# Patient Record
Sex: FEMALE | Race: WHITE | NOT HISPANIC OR LATINO
[De-identification: names, ages, dates, MRNs, and addresses within clinical notes are randomized per-mention and may not be internally consistent; named-entity substitution may affect disease eponyms.]

---

## 2020-11-12 ENCOUNTER — APPOINTMENT (OUTPATIENT)
Dept: INTERNAL MEDICINE | Facility: CLINIC | Age: 40
End: 2020-11-12
Payer: COMMERCIAL

## 2020-11-12 ENCOUNTER — TRANSCRIPTION ENCOUNTER (OUTPATIENT)
Age: 40
End: 2020-11-12

## 2020-11-12 VITALS — HEIGHT: 62.5 IN | WEIGHT: 124 LBS | BODY MASS INDEX: 22.25 KG/M2 | TEMPERATURE: 98.6 F

## 2020-11-12 VITALS — DIASTOLIC BLOOD PRESSURE: 60 MMHG | SYSTOLIC BLOOD PRESSURE: 105 MMHG

## 2020-11-12 DIAGNOSIS — Z82.49 FAMILY HISTORY OF ISCHEMIC HEART DISEASE AND OTHER DISEASES OF THE CIRCULATORY SYSTEM: ICD-10-CM

## 2020-11-12 DIAGNOSIS — Z23 ENCOUNTER FOR IMMUNIZATION: ICD-10-CM

## 2020-11-12 DIAGNOSIS — Z78.9 OTHER SPECIFIED HEALTH STATUS: ICD-10-CM

## 2020-11-12 PROCEDURE — G0008: CPT

## 2020-11-12 PROCEDURE — G0444 DEPRESSION SCREEN ANNUAL: CPT | Mod: NC,59

## 2020-11-12 PROCEDURE — 90686 IIV4 VACC NO PRSV 0.5 ML IM: CPT

## 2020-11-12 PROCEDURE — 99072 ADDL SUPL MATRL&STAF TM PHE: CPT

## 2020-11-12 PROCEDURE — 99385 PREV VISIT NEW AGE 18-39: CPT | Mod: 25

## 2020-11-12 PROCEDURE — 36415 COLL VENOUS BLD VENIPUNCTURE: CPT

## 2020-11-12 NOTE — HEALTH RISK ASSESSMENT
[Yes] : Yes [2 - 3 times a week (3 pts)] : 2 - 3  times a week (3 points) [1 or 2 (0 pts)] : 1 or 2 (0 points) [Never (0 pts)] : Never (0 points) [No] : In the past 12 months have you used drugs other than those required for medical reasons? No [No falls in past year] : Patient reported no falls in the past year [0] : 2) Feeling down, depressed, or hopeless: Not at all (0) [HIV test declined] : HIV test declined [Hepatitis C test declined] : Hepatitis C test declined [With Family] : lives with family [# of Members in Household ___] :  household currently consist of [unfilled] member(s) [Employed] : employed [] :  [# Of Children ___] : has [unfilled] children [Feels Safe at Home] : Feels safe at home [Reports normal functional visual acuity (ie: able to read med bottle)] : Reports normal functional visual acuity [Smoke Detector] : smoke detector [Carbon Monoxide Detector] : carbon monoxide detector [Seat Belt] :  uses seat belt [Sunscreen] : uses sunscreen [Patient/Caregiver not ready to engage] : Patient/Caregiver not ready to engage [Patient reported PAP Smear was normal] : Patient reported PAP Smear was normal [] : No [Audit-CScore] : 3 [de-identified] : station bike 3-4 x week [de-identified] : well balanced  [ZFE6Muxsd] : 0 [Reports changes in hearing] : Reports no changes in hearing [Reports changes in vision] : Reports no changes in vision [Reports changes in dental health] : Reports no changes in dental health [Guns at Home] : no guns at home [Safety elements used in home] : no safety elements used in home [Travel to Developing Areas] : does not  travel to developing areas [Caregiver Concerns] : does not have caregiver concerns [MammogramDate] : never [PapSmearDate] : 01/17 [PapSmearComments] : Angela Ramirez  [BoneDensityDate] : never [ColonoscopyDate] : never [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

## 2020-11-12 NOTE — HISTORY OF PRESENT ILLNESS
[FreeTextEntry1] : physical [de-identified] : Patient is a 40F with psoriasis presents today for physical\par Notes on last physical labs had elevated LDL\par Reports hot flashes, may be going through early menopause - may do hormone therapy - will be seeing GYN\par \par mirena IUD since 2014\par \par clobex cream as needed and shampoo\par wears glasses and contacts\par \par mat gf MM 79\par mat gm 61s colon cancer but alive\par \par

## 2020-11-16 ENCOUNTER — TRANSCRIPTION ENCOUNTER (OUTPATIENT)
Age: 40
End: 2020-11-16

## 2020-11-16 LAB
25(OH)D3 SERPL-MCNC: 32.4 NG/ML
ALBUMIN SERPL ELPH-MCNC: 4.9 G/DL
ALP BLD-CCNC: 42 U/L
ALT SERPL-CCNC: 9 U/L
ANION GAP SERPL CALC-SCNC: 15 MMOL/L
AST SERPL-CCNC: 21 U/L
BASOPHILS # BLD AUTO: 0.02 K/UL
BASOPHILS NFR BLD AUTO: 0.5 %
BILIRUB SERPL-MCNC: 0.3 MG/DL
BUN SERPL-MCNC: 18 MG/DL
CALCIUM SERPL-MCNC: 10 MG/DL
CHLORIDE SERPL-SCNC: 106 MMOL/L
CHOLEST SERPL-MCNC: 219 MG/DL
CO2 SERPL-SCNC: 21 MMOL/L
CREAT SERPL-MCNC: 0.77 MG/DL
EOSINOPHIL # BLD AUTO: 0.1 K/UL
EOSINOPHIL NFR BLD AUTO: 2.4 %
ESTIMATED AVERAGE GLUCOSE: 103 MG/DL
GLUCOSE SERPL-MCNC: 87 MG/DL
HBA1C MFR BLD HPLC: 5.2 %
HCT VFR BLD CALC: 40.7 %
HDLC SERPL-MCNC: 95 MG/DL
HGB BLD-MCNC: 12.8 G/DL
IMM GRANULOCYTES NFR BLD AUTO: 0 %
LDLC SERPL CALC-MCNC: 115 MG/DL
LYMPHOCYTES # BLD AUTO: 1.62 K/UL
LYMPHOCYTES NFR BLD AUTO: 38.7 %
MAN DIFF?: NORMAL
MCHC RBC-ENTMCNC: 31.3 PG
MCHC RBC-ENTMCNC: 31.4 GM/DL
MCV RBC AUTO: 99.5 FL
MONOCYTES # BLD AUTO: 0.4 K/UL
MONOCYTES NFR BLD AUTO: 9.5 %
NEUTROPHILS # BLD AUTO: 2.05 K/UL
NEUTROPHILS NFR BLD AUTO: 48.9 %
NONHDLC SERPL-MCNC: 124 MG/DL
PLATELET # BLD AUTO: 202 K/UL
POTASSIUM SERPL-SCNC: 4.3 MMOL/L
PROT SERPL-MCNC: 7.4 G/DL
RBC # BLD: 4.09 M/UL
RBC # FLD: 13.2 %
SODIUM SERPL-SCNC: 143 MMOL/L
TRIGL SERPL-MCNC: 43 MG/DL
TSH SERPL-ACNC: 1.58 UIU/ML
WBC # FLD AUTO: 4.19 K/UL

## 2020-11-18 ENCOUNTER — TRANSCRIPTION ENCOUNTER (OUTPATIENT)
Age: 40
End: 2020-11-18

## 2020-12-22 ENCOUNTER — APPOINTMENT (OUTPATIENT)
Dept: INTERNAL MEDICINE | Facility: CLINIC | Age: 40
End: 2020-12-22

## 2021-03-08 ENCOUNTER — TRANSCRIPTION ENCOUNTER (OUTPATIENT)
Age: 41
End: 2021-03-08

## 2021-03-09 ENCOUNTER — TRANSCRIPTION ENCOUNTER (OUTPATIENT)
Age: 41
End: 2021-03-09

## 2021-03-29 ENCOUNTER — RESULT REVIEW (OUTPATIENT)
Age: 41
End: 2021-03-29

## 2021-11-02 ENCOUNTER — APPOINTMENT (OUTPATIENT)
Dept: INTERNAL MEDICINE | Facility: CLINIC | Age: 41
End: 2021-11-02
Payer: COMMERCIAL

## 2021-11-02 VITALS — WEIGHT: 127 LBS | HEIGHT: 62.5 IN | TEMPERATURE: 98 F | BODY MASS INDEX: 22.79 KG/M2

## 2021-11-02 VITALS — SYSTOLIC BLOOD PRESSURE: 105 MMHG | DIASTOLIC BLOOD PRESSURE: 60 MMHG

## 2021-11-02 DIAGNOSIS — N63.10 UNSPECIFIED LUMP IN THE RIGHT BREAST, UNSPECIFIED QUADRANT: ICD-10-CM

## 2021-11-02 PROCEDURE — 90686 IIV4 VACC NO PRSV 0.5 ML IM: CPT

## 2021-11-02 PROCEDURE — 99213 OFFICE O/P EST LOW 20 MIN: CPT | Mod: 25

## 2021-11-02 PROCEDURE — 90471 IMMUNIZATION ADMIN: CPT

## 2021-11-02 NOTE — PHYSICAL EXAM
[Normal Appearance] : normal in appearance [No Axillary Lymphadenopathy] : no axillary lymphadenopathy [Normal] : normal [FreeTextEntry1] : small  1cm nodule at 3o'clock on right

## 2021-11-02 NOTE — HEALTH RISK ASSESSMENT
[Yes] : Yes [2 - 3 times a week (3 pts)] : 2 - 3  times a week (3 points) [1 or 2 (0 pts)] : 1 or 2 (0 points) [Never (0 pts)] : Never (0 points) [No] : In the past 12 months have you used drugs other than those required for medical reasons? No [No falls in past year] : Patient reported no falls in the past year [0] : 2) Feeling down, depressed, or hopeless: Not at all (0) [PHQ-2 Negative - No further assessment needed] : PHQ-2 Negative - No further assessment needed [] : No [de-identified] : socially  [Audit-CScore] : 3 [de-identified] : not at this time  [de-identified] : well balanced  [ZMC6Qkqpr] : 0

## 2021-11-02 NOTE — HISTORY OF PRESENT ILLNESS
[FreeTextEntry8] : Patient is a 41F who presents today with painful breast lump\par Started about 1 week ago\par no nipple discharge or skin changes\par recent mammo in 3/2021 reported as benign

## 2021-11-03 ENCOUNTER — TRANSCRIPTION ENCOUNTER (OUTPATIENT)
Age: 41
End: 2021-11-03

## 2021-11-11 ENCOUNTER — TRANSCRIPTION ENCOUNTER (OUTPATIENT)
Age: 41
End: 2021-11-11

## 2021-11-21 ENCOUNTER — TRANSCRIPTION ENCOUNTER (OUTPATIENT)
Age: 41
End: 2021-11-21

## 2021-12-07 ENCOUNTER — NON-APPOINTMENT (OUTPATIENT)
Age: 41
End: 2021-12-07

## 2021-12-08 ENCOUNTER — NON-APPOINTMENT (OUTPATIENT)
Age: 41
End: 2021-12-08

## 2021-12-08 ENCOUNTER — APPOINTMENT (OUTPATIENT)
Dept: BREAST CENTER | Facility: CLINIC | Age: 41
End: 2021-12-08
Payer: COMMERCIAL

## 2021-12-08 VITALS
HEIGHT: 64 IN | BODY MASS INDEX: 21.34 KG/M2 | WEIGHT: 125 LBS | SYSTOLIC BLOOD PRESSURE: 124 MMHG | DIASTOLIC BLOOD PRESSURE: 88 MMHG

## 2021-12-08 DIAGNOSIS — Z78.9 OTHER SPECIFIED HEALTH STATUS: ICD-10-CM

## 2021-12-08 DIAGNOSIS — L40.9 PSORIASIS, UNSPECIFIED: ICD-10-CM

## 2021-12-08 DIAGNOSIS — N64.4 MASTODYNIA: ICD-10-CM

## 2021-12-08 DIAGNOSIS — Z80.7 FAMILY HISTORY OF OTHER MALIGNANT NEOPLASMS OF LYMPHOID, HEMATOPOIETIC AND RELATED TISSUES: ICD-10-CM

## 2021-12-08 DIAGNOSIS — Z80.0 FAMILY HISTORY OF MALIGNANT NEOPLASM OF DIGESTIVE ORGANS: ICD-10-CM

## 2021-12-08 PROCEDURE — 99204 OFFICE O/P NEW MOD 45 MIN: CPT

## 2021-12-08 NOTE — CONSULT LETTER
[Dear  ___] : Dear  [unfilled], [( Thank you for referring [unfilled] for consultation for _____ )] : Thank you for referring [unfilled] for consultation for [unfilled] [Please see my note below.] : Please see my note below. [Consult Closing:] : Thank you very much for allowing me to participate in the care of this patient.  If you have any questions, please do not hesitate to contact me. [Sincerely,] : Sincerely, [FreeTextEntry1] : I do feel nodularity where she has reported lump and am recommending repeat breast ultrasound asap. [FreeTextEntry3] : Yamile Mckay MS DO\par Breast Surgeon\par [facility] \[place] [address]\par

## 2021-12-08 NOTE — REVIEW OF SYSTEMS
[Recent Weight Loss (___ Lbs)] : recent [unfilled] ~Ulb weight loss [Night Sweats] : night sweats [Breast Pain] : breast pain [Breast Lump] : breast lump [Hot Flashes] : hot flashes [Negative] : Heme/Lymph

## 2021-12-08 NOTE — ASSESSMENT
[FreeTextEntry1] : 40 yo female with right breast pain/lump \par reviewed her risk status, she is not high risk\par We reviewed risk reduction strategies including maintaining a BMI <25, limiting red meat intake and alcoholic beverages to 3 per week and exercise (150 min/ week low intensity or 75 min/week high intensity). And maintaining a normal vitamin D level.\par given CBE findings recommend targeted sono right breast asap\par if negative plan mg/sono  3/2022\par reviewed common etiologies of breast pain including caffeine, hormones and stress\par f/u with me after annual imaging\par She knows to call or return sooner should any concerns or questions arise.\par \par

## 2021-12-08 NOTE — HISTORY OF PRESENT ILLNESS
[FreeTextEntry1] : This is a 41 year old female referred by Dr. Burch for evaluation for a painful breast lump. She first noticed it in November.  She states that this was the first time she experienced this type of pain. She states the lump and pain resolved after her period started. She states the pain started 1 month prior to her period and then when she pressed on the pain she felt a lumpiness. Denies any trauma, changes to herbs, diet or supplements.\par \par She does SBE. \par She has not noticed a change in her breast or a breast lump currently.\par She has not noticed a change in her nipple or nipple area.\par She has not noticed a change in the skin of the breast.\par She is not experiencing nipple discharge.\par She is not experiencing breast pain currently.\par She has not noticed a lump or lymph node under the armpit. \par \par BREAST CANCER RISK FACTORS\par Menarche: 12\par Date of LMP: 11/22/2021 -- irregular pt on MIrena \par Menopause: pre \par Grav:  1    Para: 2 ( 8 year old twin boys)\par Age at first live birth: 34\par Nursed: Yes for 8 months \par Hysterectomy: N \par Oophorectomy: N \par OCP: Y currently for about 12\par HRT: Y for 2 months in the past Divigel; IUI x3 cycles\par Last pap/pelvic exam: 3/2021 WNL\par Related family history: none\par Ashkenazi: N\par Mastery risk assessment: BRCAPRO 14.1% TCv7 11.9% TCv8 10.0% Kaley 9.9%\par BRCA testing: N \par Bra size: 34B\par \par Last mammogram: 11/10/2021 right diag (3/29/2021 bilat)        Location: [facility] \par Report reviewed.                                                                       Images reviewed.\par Results: BIRADS 1\par dense breasts. Stable examination with no evidence of malignancy. \par  \par Last ultrasound:  11/10/2021              Location: [facility]\par Report reviewed.                                 Images reviewed. \par Results: BIRADS 1\par stable examination with no evidence of malignancy. \par \par Last MRI:                                             Location:\par Report reviewed.\par \par

## 2021-12-08 NOTE — PHYSICAL EXAM
[Normocephalic] : normocephalic [Atraumatic] : atraumatic [Supple] : supple [No Supraclavicular Adenopathy] : no supraclavicular adenopathy [Examined in the supine and seated position] : examined in the supine and seated position [No dominant masses] : no dominant masses left breast [No Nipple Retraction] : no left nipple retraction [No Nipple Discharge] : no left nipple discharge [No Axillary Lymphadenopathy] : no left axillary lymphadenopathy [No Edema] : no edema [No Rashes] : no rashes [No Ulceration] : no ulceration [de-identified] : in area of patient's concern mid-sternal line along rib there is a nodularity corresponding to her concern, no redness, no skin changes

## 2022-01-03 ENCOUNTER — APPOINTMENT (OUTPATIENT)
Dept: INTERNAL MEDICINE | Facility: CLINIC | Age: 42
End: 2022-01-03

## 2022-01-25 ENCOUNTER — RESULT REVIEW (OUTPATIENT)
Age: 42
End: 2022-01-25

## 2022-03-15 ENCOUNTER — NON-APPOINTMENT (OUTPATIENT)
Age: 42
End: 2022-03-15

## 2022-03-16 ENCOUNTER — APPOINTMENT (OUTPATIENT)
Dept: INTERNAL MEDICINE | Facility: CLINIC | Age: 42
End: 2022-03-16
Payer: COMMERCIAL

## 2022-03-16 VITALS
TEMPERATURE: 98 F | WEIGHT: 129 LBS | RESPIRATION RATE: 16 BRPM | HEIGHT: 64 IN | BODY MASS INDEX: 22.02 KG/M2 | HEART RATE: 88 BPM | SYSTOLIC BLOOD PRESSURE: 100 MMHG | DIASTOLIC BLOOD PRESSURE: 70 MMHG | OXYGEN SATURATION: 98 %

## 2022-03-16 DIAGNOSIS — Z83.71 FAMILY HISTORY OF COLONIC POLYPS: ICD-10-CM

## 2022-03-16 DIAGNOSIS — Z00.00 ENCOUNTER FOR GENERAL ADULT MEDICAL EXAMINATION W/OUT ABNORMAL FINDINGS: ICD-10-CM

## 2022-03-16 PROCEDURE — 36415 COLL VENOUS BLD VENIPUNCTURE: CPT

## 2022-03-16 PROCEDURE — 99396 PREV VISIT EST AGE 40-64: CPT | Mod: 25

## 2022-03-16 RX ORDER — CLOBETASOL PROPIONATE 0.05 G/100ML
0.05 SHAMPOO TOPICAL
Refills: 0 | Status: DISCONTINUED | COMMUNITY
Start: 2020-11-12 | End: 2022-03-16

## 2022-03-16 RX ORDER — CLOBETASOL PROPIONATE 0.5 MG/G
0.05 CREAM TOPICAL
Refills: 0 | Status: DISCONTINUED | COMMUNITY
Start: 2020-11-12 | End: 2022-03-16

## 2022-03-16 NOTE — HEALTH RISK ASSESSMENT
[Excellent] : ~his/her~  mood as  excellent [Never] : Never [Yes] : Yes [1 or 2 (0 pts)] : 1 or 2 (0 points) [Never (0 pts)] : Never (0 points) [No falls in past year] : Patient reported no falls in the past year [0] : 2) Feeling down, depressed, or hopeless: Not at all (0) [Patient reported mammogram was normal] : Patient reported mammogram was normal [Patient reported PAP Smear was normal] : Patient reported PAP Smear was normal [HIV test declined] : HIV test declined [Hepatitis C test declined] : Hepatitis C test declined [With Family] : lives with family [Employed] : employed [] :  [# Of Children ___] : has [unfilled] children [Sexually Active] : sexually active [Feels Safe at Home] : Feels safe at home [Monthly or less (1 pt)] : Monthly or less (1 point) [PHQ-2 Negative - No further assessment needed] : PHQ-2 Negative - No further assessment needed [de-identified] : SOCIAL [Audit-CScore] : 1 [de-identified] : PATIENT WALKS WEEKLY [de-identified] : PATIENT HAS BALANCED MEALS [CZH0Pthii] : 0 [Change in mental status noted] : No change in mental status noted [High Risk Behavior] : no high risk behavior [MammogramDate] : 11/21 [PapSmearDate] : 03/19 [de-identified] :  AND 2 CHILDREN [FreeTextEntry2] :

## 2022-03-16 NOTE — HISTORY OF PRESENT ILLNESS
[FreeTextEntry1] : physical [de-identified] : Patient is a 41F with psoriasis presents today for physical\par Has seen Dr. Mckay in follow up for palpable breast lump (mammo and sono x 2 normal), will be having mammo/sono 4/22 with 1 ys follow up with Dr. Mckay\par \par mirena IUD since 2014\par \par Colon polyps in mom and dad \par MGM 61 with colon cancer, survived\par \par

## 2022-03-28 ENCOUNTER — TRANSCRIPTION ENCOUNTER (OUTPATIENT)
Age: 42
End: 2022-03-28

## 2022-03-28 LAB
ALBUMIN SERPL ELPH-MCNC: 4.8 G/DL
ALP BLD-CCNC: 54 U/L
ALT SERPL-CCNC: 19 U/L
ANION GAP SERPL CALC-SCNC: 11 MMOL/L
AST SERPL-CCNC: 25 U/L
BASOPHILS # BLD AUTO: 0.01 K/UL
BASOPHILS NFR BLD AUTO: 0.2 %
BILIRUB SERPL-MCNC: 0.2 MG/DL
BUN SERPL-MCNC: 16 MG/DL
CALCIUM SERPL-MCNC: 9.9 MG/DL
CHLORIDE SERPL-SCNC: 103 MMOL/L
CHOLEST SERPL-MCNC: 251 MG/DL
CO2 SERPL-SCNC: 25 MMOL/L
CREAT SERPL-MCNC: 0.71 MG/DL
EGFR: 110 ML/MIN/1.73M2
EOSINOPHIL # BLD AUTO: 0.05 K/UL
EOSINOPHIL NFR BLD AUTO: 0.9 %
GLUCOSE SERPL-MCNC: 95 MG/DL
HCT VFR BLD CALC: 40.5 %
HDLC SERPL-MCNC: 96 MG/DL
HGB BLD-MCNC: 13.2 G/DL
IMM GRANULOCYTES NFR BLD AUTO: 0.2 %
LDLC SERPL CALC-MCNC: 137 MG/DL
LYMPHOCYTES # BLD AUTO: 1.71 K/UL
LYMPHOCYTES NFR BLD AUTO: 29.8 %
MAN DIFF?: NORMAL
MCHC RBC-ENTMCNC: 31.2 PG
MCHC RBC-ENTMCNC: 32.6 GM/DL
MCV RBC AUTO: 95.7 FL
MONOCYTES # BLD AUTO: 0.31 K/UL
MONOCYTES NFR BLD AUTO: 5.4 %
NEUTROPHILS # BLD AUTO: 3.65 K/UL
NEUTROPHILS NFR BLD AUTO: 63.5 %
NONHDLC SERPL-MCNC: 155 MG/DL
PLATELET # BLD AUTO: 211 K/UL
POTASSIUM SERPL-SCNC: 4.5 MMOL/L
PROT SERPL-MCNC: 7.4 G/DL
RBC # BLD: 4.23 M/UL
RBC # FLD: 13.1 %
SODIUM SERPL-SCNC: 139 MMOL/L
TRIGL SERPL-MCNC: 89 MG/DL
WBC # FLD AUTO: 5.74 K/UL

## 2022-04-14 ENCOUNTER — RESULT REVIEW (OUTPATIENT)
Age: 42
End: 2022-04-14

## 2022-04-25 ENCOUNTER — APPOINTMENT (OUTPATIENT)
Dept: BREAST CENTER | Facility: CLINIC | Age: 42
End: 2022-04-25
Payer: COMMERCIAL

## 2022-04-25 VITALS
WEIGHT: 129 LBS | DIASTOLIC BLOOD PRESSURE: 81 MMHG | HEIGHT: 64 IN | HEART RATE: 64 BPM | SYSTOLIC BLOOD PRESSURE: 120 MMHG | BODY MASS INDEX: 22.02 KG/M2

## 2022-04-25 DIAGNOSIS — R92.2 INCONCLUSIVE MAMMOGRAM: ICD-10-CM

## 2022-04-25 PROCEDURE — 99214 OFFICE O/P EST MOD 30 MIN: CPT

## 2022-04-25 NOTE — ASSESSMENT
[FreeTextEntry1] : 43 yo female with right breast pain/lump \par reviewed her risk status, she is not high risk\par We reviewed risk reduction strategies including maintaining a BMI <25, limiting red meat intake and alcoholic beverages to 3 per week and exercise (150 min/ week low intensity or 75 min/week high intensity). And maintaining a normal vitamin D level.\par \par if negative plan mg/sono  3/2023\par reviewed common etiologies of breast pain including caffeine, hormones and stress\par f/u with me after annual imaging\par She knows to call or return sooner should any concerns or questions arise.\par \par

## 2022-04-25 NOTE — HISTORY OF PRESENT ILLNESS
[FreeTextEntry1] : This is a 41 year old female referred by Dr. Burch for evaluation for a painful breast lump. She first noticed it in November.  She states that this was the first time she experienced this type of pain. She states the lump and pain resolved after her period started. She states the pain started 1 month prior to her period and then when she pressed on the pain she felt a lumpiness. Denies any trauma, changes to herbs, diet or supplements.\par \par She does SBE. \par She has not noticed a change in her breast or a breast lump currently.\par She has not noticed a change in her nipple or nipple area.\par She has not noticed a change in the skin of the breast.\par She is not experiencing nipple discharge.\par She is not experiencing breast pain currently.\par She has not noticed a lump or lymph node under the armpit. \par \par BREAST CANCER RISK FACTORS\par Menarche: 12\par Date of LMP: 11/22/2021 -- irregular pt on MIrena \par Menopause: pre \par Grav:  1    Para: 2 ( 8 year old twin boys)\par Age at first live birth: 34\par Nursed: Yes for 8 months \par Hysterectomy: N \par Oophorectomy: N \par OCP: Y currently for about 12\par HRT: Y for 2 months in the past Divigel; IUI x3 cycles\par Last pap/pelvic exam: 3/2021 WNL\par Related family history: none\par Ashkenazi: N\par Mastery risk assessment: BRCAPRO 14.1% TCv7 11.9% TCv8 10.0% Kaley 9.9%\par BRCA testing: N \par Bra size: 34B\par \par Last mammogram: 4/14/2022        Location: [facility]\par Report reviewed.                          Images reviewed.\par Results: BIRADS 2\par dense breasts. Stable examination with no evidence of malignancy. \par  \par Last ultrasound:  4/14/2022            Location: [facility]\par Report reviewed.                                 Images reviewed. \par Results: BIRADS 2\par stable examination with no evidence of malignancy. \par \par Last MRI:                                             Location:\par Report reviewed.\par \par

## 2022-04-25 NOTE — PHYSICAL EXAM
[Normocephalic] : normocephalic [Atraumatic] : atraumatic [Supple] : supple [No Supraclavicular Adenopathy] : no supraclavicular adenopathy [Examined in the supine and seated position] : examined in the supine and seated position [No dominant masses] : no dominant masses left breast [No Nipple Retraction] : no left nipple retraction [No Nipple Discharge] : no left nipple discharge [No Axillary Lymphadenopathy] : no left axillary lymphadenopathy [No Edema] : no edema [No Rashes] : no rashes [No Ulceration] : no ulceration [Symmetrical] : symmetrical [de-identified] : in area of patient's concern mid-sternal line along rib there is no longer a nodularity corresponding to her concern, no redness, no skin changes

## 2022-04-25 NOTE — CONSULT LETTER
[Dear  ___] : Dear  [unfilled], [( Thank you for referring [unfilled] for consultation for _____ )] : Thank you for referring [unfilled] for consultation for [unfilled] [Please see my note below.] : Please see my note below. [Consult Closing:] : Thank you very much for allowing me to participate in the care of this patient.  If you have any questions, please do not hesitate to contact me. [Sincerely,] : Sincerely, [FreeTextEntry1] : I do feel nodularity where she has reported lump and am recommending repeat breast ultrasound asap. [FreeTextEntry3] : Yamile Mckay MS DO\par Breast Surgeon\par [facility] \[place] [address]\par

## 2022-08-09 ENCOUNTER — APPOINTMENT (OUTPATIENT)
Dept: INTERNAL MEDICINE | Facility: CLINIC | Age: 42
End: 2022-08-09

## 2022-08-09 VITALS — BODY MASS INDEX: 21.51 KG/M2 | HEIGHT: 64 IN | TEMPERATURE: 98 F | WEIGHT: 126 LBS

## 2022-08-09 VITALS — DIASTOLIC BLOOD PRESSURE: 60 MMHG | SYSTOLIC BLOOD PRESSURE: 120 MMHG

## 2022-08-09 DIAGNOSIS — F41.8 OTHER SPECIFIED ANXIETY DISORDERS: ICD-10-CM

## 2022-08-09 PROCEDURE — 36415 COLL VENOUS BLD VENIPUNCTURE: CPT

## 2022-08-09 PROCEDURE — 99213 OFFICE O/P EST LOW 20 MIN: CPT | Mod: 25

## 2022-08-09 NOTE — HISTORY OF PRESENT ILLNESS
[de-identified] : Patient is a 41F with psoriasis presents today for recent increase in bruising\par Bruising up the back of the leg a few days ago and now in front of legs and arms\par No trauma to the area\par No prior hx of bleeding \par recently had COVID in June, mild symptoms but took about 1 month to feel normal again\par \par \par

## 2022-08-09 NOTE — HEALTH RISK ASSESSMENT
[Never] : Never [Yes] : Yes [Monthly or less (1 pt)] : Monthly or less (1 point) [1 or 2 (0 pts)] : 1 or 2 (0 points) [Never (0 pts)] : Never (0 points) [No falls in past year] : Patient reported no falls in the past year [0] : 2) Feeling down, depressed, or hopeless: Not at all (0) [PHQ-2 Negative - No further assessment needed] : PHQ-2 Negative - No further assessment needed [Patient/Caregiver not ready to engage] : , patient/caregiver not ready to engage [de-identified] : rare [Audit-CScore] : 1 [de-identified] : PATIENT WALKS WEEKLY [de-identified] : PATIENT HAS BALANCED MEALS [[id number]] : 0 [AdvancecareDate] : 8/2022

## 2022-08-09 NOTE — PHYSICAL EXAM
[Normal Sclera/Conjunctiva] : normal sclera/conjunctiva [Normal Outer Ear/Nose] : the outer ears and nose were normal in appearance [Normal TMs] : both tympanic membranes were normal [Thyroid Normal, No Nodules] : the thyroid was normal and there were no nodules present [Normal] : normal rate, regular rhythm, normal S1 and S2 and no murmur heard [No Edema] : there was no peripheral edema [Soft] : abdomen soft [Non Tender] : non-tender [No Masses] : no abdominal mass palpated [Normal Bowel Sounds] : normal bowel sounds [de-identified] : no hepatosplenomegaly [de-identified] : scattered bruising of arms and legs

## 2022-08-21 ENCOUNTER — TRANSCRIPTION ENCOUNTER (OUTPATIENT)
Age: 42
End: 2022-08-21

## 2022-08-21 DIAGNOSIS — R79.89 OTHER SPECIFIED ABNORMAL FINDINGS OF BLOOD CHEMISTRY: ICD-10-CM

## 2022-08-21 DIAGNOSIS — R23.8 OTHER SKIN CHANGES: ICD-10-CM

## 2022-08-21 LAB
ALBUMIN SERPL ELPH-MCNC: 4.5 G/DL
ALP BLD-CCNC: 53 U/L
ALT SERPL-CCNC: 14 U/L
ANION GAP SERPL CALC-SCNC: 18 MMOL/L
APTT BLD: 29.7 SEC
AST SERPL-CCNC: 27 U/L
BASOPHILS # BLD AUTO: 0.01 K/UL
BASOPHILS NFR BLD AUTO: 0.2 %
BILIRUB SERPL-MCNC: 0.4 MG/DL
BUN SERPL-MCNC: 17 MG/DL
CALCIUM SERPL-MCNC: 10.3 MG/DL
CHLORIDE SERPL-SCNC: 100 MMOL/L
CO2 SERPL-SCNC: 24 MMOL/L
CREAT SERPL-MCNC: 0.75 MG/DL
EGFR: 103 ML/MIN/1.73M2
EOSINOPHIL # BLD AUTO: 0.06 K/UL
EOSINOPHIL NFR BLD AUTO: 1.4 %
GLUCOSE SERPL-MCNC: 54 MG/DL
HCT VFR BLD CALC: 39.3 %
HGB BLD-MCNC: 12.6 G/DL
IMM GRANULOCYTES NFR BLD AUTO: 0 %
INR PPP: 0.97 RATIO
LYMPHOCYTES # BLD AUTO: 1.63 K/UL
LYMPHOCYTES NFR BLD AUTO: 37.1 %
MAN DIFF?: NORMAL
MCHC RBC-ENTMCNC: 31.7 PG
MCHC RBC-ENTMCNC: 32.1 GM/DL
MCV RBC AUTO: 98.7 FL
MONOCYTES # BLD AUTO: 0.35 K/UL
MONOCYTES NFR BLD AUTO: 8 %
NEUTROPHILS # BLD AUTO: 2.34 K/UL
NEUTROPHILS NFR BLD AUTO: 53.3 %
PLATELET # BLD AUTO: 212 K/UL
POTASSIUM SERPL-SCNC: 3.9 MMOL/L
PROT SERPL-MCNC: 7.4 G/DL
PT BLD: 11.3 SEC
RBC # BLD: 3.98 M/UL
RBC # FLD: 13.8 %
SODIUM SERPL-SCNC: 143 MMOL/L
VIT C SERPL-MCNC: 0.2 MG/DL
WBC # FLD AUTO: 4.39 K/UL

## 2022-08-26 LAB — MENADIONE SERPL-MCNC: 0.76 NG/ML

## 2022-09-28 ENCOUNTER — LABORATORY RESULT (OUTPATIENT)
Age: 42
End: 2022-09-28

## 2022-10-12 ENCOUNTER — TRANSCRIPTION ENCOUNTER (OUTPATIENT)
Age: 42
End: 2022-10-12

## 2023-01-12 ENCOUNTER — APPOINTMENT (OUTPATIENT)
Dept: GASTROENTEROLOGY | Facility: CLINIC | Age: 43
End: 2023-01-12
Payer: COMMERCIAL

## 2023-01-12 VITALS
HEIGHT: 64 IN | HEART RATE: 76 BPM | RESPIRATION RATE: 19 BRPM | SYSTOLIC BLOOD PRESSURE: 134 MMHG | WEIGHT: 127 LBS | DIASTOLIC BLOOD PRESSURE: 80 MMHG | OXYGEN SATURATION: 98 % | BODY MASS INDEX: 21.68 KG/M2

## 2023-01-12 DIAGNOSIS — Z12.11 ENCOUNTER FOR SCREENING FOR MALIGNANT NEOPLASM OF COLON: ICD-10-CM

## 2023-01-12 PROCEDURE — 99203 OFFICE O/P NEW LOW 30 MIN: CPT

## 2023-01-12 NOTE — ASSESSMENT
[FreeTextEntry1] : CRC screening- \par recommend early screening due to fam hx crc and mother with colon polyps\par Risks (including but not limited to sedation risks, infection, bleeding, perforation, possibility of missed lesions), benefits and alternatives to procedure, including not doing the procedure, were discussed with patient. Patient understood and agreed to proceed with colonoscopy. \par Colonoscopy preparation instructions reviewed with patient.\par LF diet x 3 days\par 2 Dulcolax two days prior to procedure + Split MiraLAX/Dulcolax preparation starting day prior to procedure\par

## 2023-01-12 NOTE — PHYSICAL EXAM

## 2023-01-12 NOTE — HISTORY OF PRESENT ILLNESS
[FreeTextEntry1] : 42 year old F with family h/o crc (mat gm-CRC, mother- Colon polyps) presents for evaluation of colon cancer screening. \par She is seen at the request of Dr. Janell Burch. \par \par in last 8 months cooked greens are now causing bloating and shooting gas  pain -stomach pain -does not occur with raw greens \par COVID  in 05/2022 , later easy bruising, found to have vit c afterwards, better with Vit C supplement , bruising resolved. \par \par advil weekly for HA\par \par No prior colonoscopy\par \par Patient denies n/v/heartburn, reflux, dysphagia/odynophagia, change in bowel habits, diarrhea, constipation, brbpr, melena. no weight loss.  Good appetite and energy level. Patient has daily BM, denies regular NSAID use. \par \par Fam hx:\par mat gm crc age 65, doing well, \par mat gf mm, lung ca\par mother -colon polyp at age 47, other colonoscopies with small polyps\par

## 2023-05-25 ENCOUNTER — RESULT REVIEW (OUTPATIENT)
Age: 43
End: 2023-05-25

## 2023-06-14 ENCOUNTER — RESULT REVIEW (OUTPATIENT)
Age: 43
End: 2023-06-14

## 2023-06-15 ENCOUNTER — APPOINTMENT (OUTPATIENT)
Dept: GASTROENTEROLOGY | Facility: HOSPITAL | Age: 43
End: 2023-06-15

## 2023-06-28 ENCOUNTER — NON-APPOINTMENT (OUTPATIENT)
Age: 43
End: 2023-06-28

## 2023-09-19 ENCOUNTER — APPOINTMENT (OUTPATIENT)
Dept: BREAST CENTER | Facility: CLINIC | Age: 43
End: 2023-09-19
Payer: COMMERCIAL

## 2023-09-19 VITALS
BODY MASS INDEX: 20.83 KG/M2 | HEIGHT: 64 IN | HEART RATE: 63 BPM | SYSTOLIC BLOOD PRESSURE: 119 MMHG | DIASTOLIC BLOOD PRESSURE: 85 MMHG | WEIGHT: 122 LBS

## 2023-09-19 DIAGNOSIS — R92.2 INCONCLUSIVE MAMMOGRAM: ICD-10-CM

## 2023-09-19 PROCEDURE — 99214 OFFICE O/P EST MOD 30 MIN: CPT

## 2023-09-19 RX ORDER — ALPRAZOLAM 0.5 MG/1
0.5 TABLET ORAL
Qty: 10 | Refills: 0 | Status: DISCONTINUED | COMMUNITY
Start: 2022-08-09 | End: 2023-09-19

## 2023-09-19 RX ORDER — FLUTICASONE PROPIONATE 50 MCG
SPRAY, SUSPENSION NASAL
Refills: 0 | Status: DISCONTINUED | COMMUNITY
End: 2023-09-19

## 2023-09-19 RX ORDER — MULTIVIT-MIN/IRON/FOLIC ACID/K 18-600-40
CAPSULE ORAL
Refills: 0 | Status: ACTIVE | COMMUNITY

## 2023-09-19 RX ORDER — LEVONORGESTREL 52 MG/1
INTRAUTERINE DEVICE INTRAUTERINE
Refills: 0 | Status: DISCONTINUED | COMMUNITY
End: 2023-09-19

## 2024-01-12 ENCOUNTER — APPOINTMENT (OUTPATIENT)
Dept: PODIATRY | Facility: CLINIC | Age: 44
End: 2024-01-12
Payer: COMMERCIAL

## 2024-01-12 VITALS — BODY MASS INDEX: 20.49 KG/M2 | HEIGHT: 64 IN | WEIGHT: 120 LBS

## 2024-01-12 PROCEDURE — 99024 POSTOP FOLLOW-UP VISIT: CPT

## 2024-01-12 PROCEDURE — 73630 X-RAY EXAM OF FOOT: CPT | Mod: RT

## 2024-01-12 NOTE — PHYSICAL EXAM
[General Appearance - Alert] : alert [General Appearance - In No Acute Distress] : in no acute distress [FreeTextEntry3] : Vascular exam reveals palpable pedal pulses, the foot is warm to touch, there was good capillary fill time, the skin is normal in appearance there is no evidence of vascular disease or compromise at this time [de-identified] : the patient has a moderate hallux valgus deformity, with an inflamed and erythematous medial eminence, decreased range of motion dorsiflexion and plantar flexion, pain upon palpation of the area with tracking noted. The patient admits to pain in all types of shoe gear both "dress" and casual, and has noticed increase in both pain and deformity. There's decreased range of motion only approximately 30-45 before they complain of increased pain. They also have decreased plantar flexion and does admit to pain along the extensor tendon [Skin Color & Pigmentation] : normal skin color and pigmentation [Skin Turgor] : normal skin turgor [Skin Lesions] : no skin lesions [Foot Ulcer] : no foot ulcer [Skin Induration] : no skin induration [FreeTextEntry1] : mild psoriasis noted both feet [Sensation] : the sensory exam was normal to light touch and pinprick [No Focal Deficits] : no focal deficits [Deep Tendon Reflexes (DTR)] : deep tendon reflexes were 2+ and symmetric [Motor Exam] : the motor exam was normal [Oriented To Time, Place, And Person] : oriented to person, place, and time [Impaired Insight] : insight and judgment were intact [Affect] : the affect was normal

## 2024-01-12 NOTE — REVIEW OF SYSTEMS
[Leg Claudication] : no intermittent leg claudication [Lower Ext Edema] : no lower extremity edema [As Noted in HPI] : as noted in HPI [Arthralgias] : no arthralgias [Limb Pain] : no limb pain [Limb Swelling] : no limb swelling [Negative] : Neurological

## 2024-01-12 NOTE — HISTORY OF PRESENT ILLNESS
[FreeTextEntry1] : Location: HAV right foot Duration: well over 1 year, recently getting progressively worse rather and more painful in all types of shoes.  She can no longer wear normal shoes for any length of time without getting redness and pain Etiology: heredity Past Tx: alteration of shoes, OTC meds/products Exacerbated by:pressure, shoes, walking

## 2024-01-12 NOTE — PROCEDURE
[FreeTextEntry1] : X-rays were taken in the office multiple views right foot simulating angle and base of gait X-rays show a moderate hallux valgus with increased intermetatarsal angle.  Only a mild increase in the hallux abductus angle. had a lengthy discussion with the patient regarding the diagnosis etiology and differential diagnosis as well as treatment options for the presenting problem. Risks alternatives and benefits of treatment ranging from conservative to surgical explained in great detail. I also explained the progression of treatment from conservative to possible surgical treatment options as well as the benefits of each. I do stress conservative treatment if in fact conservative treatment is an option until it no longer provides relief. Over-the-counter products medications padding, and splinting were reviewed as well. All questions asked and answered appropriately to the patient's satisfaction had a lengthy discussion with the patient regarding the diagnosis etiology and differential diagnosis as well as treatment options for the presenting problem. Risks alternatives and benefits of treatment ranging from conservative to surgical explained in great detail. I also explained the progression of treatment from conservative to possible surgical treatment options as well as the benefits of each. I do stress conservative treatment if in fact conservative treatment is an option until it no longer provides relief. Over-the-counter products medications padding, and splinting were reviewed as well. All questions asked and answered appropriately to the patient's satisfaction I had a lengthy discussion with the patient regarding surgical as well as conservative options. Conservative options included anti-inflammatory medication both over-the-counter and prescription. Other conservative measures including alteration of shoe gear as well as pads to go over the painful areas. Silipos shields were supplied for all bony pressure areas of pain. I then had a lengthy discussion with the patient regarding surgical intervention. I used x-rays bone models an erasable wax board and their own foot in the discussion. Educational literature was also dispensed. I explained surgical intervention as well as the risks alternatives and benefits to surgical intervention.  I explained that it is elective surgery and the decision to continue would be theirs. I also explained that there is an option of no surgery and all. During my surgical evaluation and consultation, I explained the need for cutting and removal and possibly repositioning of bone. I discussed the difference between simply cutting bone and removing the painful bone and in the case of more severe deformities there is also the option of cutting repositioning and fixating the cut bone with either wire screw or staple or a combination of both 3. I explained to the patient that these decisions are usually made during the surgery however I explained to the patient given my findings today the anticipated but not definitive surgical procedure. I explained to the patient there are always choice is to be made during the surgery that sometimes we cannot predict prior to the surgery. Some of the risks explained were, but not limited to, infection recurrence of deformity chronic pain chronic swelling nerve injury injury to the blood vessels as well as the possible need for further surgery should the original procedure not entirely correct or overcorrect the problem. I also explained other more serious complications such as blood clots, pulmonary embolism, complex regional pain syndrome, and even more serious complications from anesthesia which are rare but could include death. I explained the post operative course as well as the need to be in either a special surgical boot or a surgical shoe postop. I explained to the patient that the duration of time would be approximately 4-6 weeks and sometimes longer in this special type of postop shoe. I also explained to the patient that there would be a need for physical therapy after the surgery in order to decrease swelling and help increase and improve range of motion at the surgical sites. Postoperative compliance regarding dressing changes the type of shoes that they should be wearing compliance with physical therapy as well as keeping all postop appointments were stressed on several occasions. I explained to the patient there is no way to tell preoperatively how much swelling and pain it will have postop and this will directly relate to when they can get back into a shoe comfortably The patient was also told that formal Physical therapy will be required post op. The patient was told that in some cases wires/pins need to be implanted which can either be permanent or removable however the first choice  is always compression screws but when this is not possible either to poor bone bone stock or other unforeseen findings intraoperatively that K wire fixation is there is a possibility and this could delay healing. The patient was also to some cases been anticipated they will be required to use crutches postop. The patient was told that there is some intra-operative instances where they cannot be predicted preoperatively but there is a possibility. greater than 45 minutes spent with patient

## 2024-03-14 ENCOUNTER — APPOINTMENT (OUTPATIENT)
Dept: PODIATRY | Facility: CLINIC | Age: 44
End: 2024-03-14
Payer: COMMERCIAL

## 2024-03-14 VITALS
HEIGHT: 64 IN | BODY MASS INDEX: 20.49 KG/M2 | DIASTOLIC BLOOD PRESSURE: 86 MMHG | HEART RATE: 78 BPM | SYSTOLIC BLOOD PRESSURE: 123 MMHG | WEIGHT: 120 LBS | OXYGEN SATURATION: 97 %

## 2024-03-14 DIAGNOSIS — M20.11 HALLUX VALGUS (ACQUIRED), RIGHT FOOT: ICD-10-CM

## 2024-03-14 PROCEDURE — 99214 OFFICE O/P EST MOD 30 MIN: CPT

## 2024-03-14 NOTE — PROCEDURE
[FreeTextEntry1] : All diagnostic test, labs, imaging, prior notes and reports (both new and recent) reviewed prior to seeing the patient and discussed at length face to face with the patient. How these results pertain to the patient, their diagnosis and treatments also reviewed. All questions asked and answered appropriately. The risks and complications of not following my recommendations d/w the patient. A lengthy discussion with the patient regarding the diagnosis etiology conservative treatment as well as surgical treatments for the diagnosis. I advised the patient that conservative therapy is always preferred to surgical intervention however when conservative therapy fails it is time to consider surgical intervention. I explained how this current condition is treated surgically. I explained anticipated surgical procedure, anticipated postoperative course, the need for strict postoperative compliance regarding dressing changes, any assistive devices needed postoperatively and the importance of coming for scheduled surgical visits afterwards. Risks alternatives and benefits to surgical procedures were explained to the patient in great detail. I used bone models barefoot diagrams as well as and erasable board to explain etiology and treatment options should surgery be the ultimate decision. Again the risks and benefits reviewed all questions asked and answered appropriately Today since the patient wished to proceed with surgical intervention or preoperative paperwork was filled out copy and scanned into his medical record. Preoperative orders, postoperative orders, postoperative expectations, as well as consent was filled out diagrams issued regarding the type of surgery to be performed and all questions asked and answered appropriately regarding the anticipated surgeries as well as the risks alternatives and benefits. greater than 30 minutes spent with patient

## 2024-03-14 NOTE — HISTORY OF PRESENT ILLNESS
[FreeTextEntry1] : Location: HAV right foot Duration: well over 1 year, recently getting progressively worse rather and more painful in all types of shoes.  She can no longer wear normal shoes for any length of time without getting redness and pain Etiology: heredity Past Tx: alteration of shoes, OTC meds/products Exacerbated by:pressure, shoes, walking  After the lengthy and informative discussion we had at her initial visit she tried all suggested conservative measures, for which she said most of them she had already tried and they did not control her pain or the progression of deformity and she presents today seeking surgical intervention and would like to schedule

## 2024-03-14 NOTE — PHYSICAL EXAM
[FreeTextEntry3] : Vascular exam reveals palpable pedal pulses, the foot is warm to touch, there was good capillary fill time, the skin is normal in appearance there is no evidence of vascular disease or compromise at this time [de-identified] : the patient has a moderate hallux valgus deformity, with an inflamed and erythematous medial eminence, decreased range of motion dorsiflexion and plantar flexion, pain upon palpation of the area with tracking noted. The patient admits to pain in all types of shoe gear both "dress" and casual, and has noticed increase in both pain and deformity. There's decreased range of motion only approximately 30-45 before they complain of increased pain. They also have decreased plantar flexion and does admit to pain along the extensor tendon [Skin Turgor] : normal skin turgor [Skin Color & Pigmentation] : normal skin color and pigmentation [Skin Lesions] : no skin lesions [Foot Ulcer] : no foot ulcer [FreeTextEntry1] : mild psoriasis noted both feet [Skin Induration] : no skin induration

## 2024-03-14 NOTE — REASON FOR VISIT
[Foot Deformity] : foot deformity [Follow-Up Visit] : a follow-up visit for [Foot Pain] : foot pain [FreeTextEntry2] : right foot

## 2024-03-26 ENCOUNTER — RESULT REVIEW (OUTPATIENT)
Age: 44
End: 2024-03-26

## 2024-04-10 ENCOUNTER — APPOINTMENT (OUTPATIENT)
Dept: PODIATRY | Facility: HOSPITAL | Age: 44
End: 2024-04-10

## 2024-05-21 ENCOUNTER — APPOINTMENT (OUTPATIENT)
Dept: OBGYN | Facility: CLINIC | Age: 44
End: 2024-05-21

## 2024-06-28 ENCOUNTER — APPOINTMENT (OUTPATIENT)
Dept: OBGYN | Facility: CLINIC | Age: 44
End: 2024-06-28
Payer: COMMERCIAL

## 2024-06-28 VITALS
BODY MASS INDEX: 21.51 KG/M2 | SYSTOLIC BLOOD PRESSURE: 118 MMHG | DIASTOLIC BLOOD PRESSURE: 70 MMHG | WEIGHT: 126 LBS | HEIGHT: 64 IN

## 2024-06-28 DIAGNOSIS — Z12.31 ENCOUNTER FOR SCREENING MAMMOGRAM FOR MALIGNANT NEOPLASM OF BREAST: ICD-10-CM

## 2024-06-28 DIAGNOSIS — Z78.0 ASYMPTOMATIC MENOPAUSAL STATE: ICD-10-CM

## 2024-06-28 DIAGNOSIS — Z01.419 ENCOUNTER FOR GYNECOLOGICAL EXAMINATION (GENERAL) (ROUTINE) W/OUT ABNORMAL FINDINGS: ICD-10-CM

## 2024-06-28 DIAGNOSIS — Z12.39 ENCOUNTER FOR OTHER SCREENING FOR MALIGNANT NEOPLASM OF BREAST: ICD-10-CM

## 2024-06-28 PROCEDURE — 99459 PELVIC EXAMINATION: CPT

## 2024-06-28 PROCEDURE — 99386 PREV VISIT NEW AGE 40-64: CPT

## 2024-09-10 ENCOUNTER — APPOINTMENT (OUTPATIENT)
Dept: OBGYN | Facility: CLINIC | Age: 44
End: 2024-09-10
Payer: COMMERCIAL

## 2024-09-10 VITALS
WEIGHT: 126 LBS | HEIGHT: 64 IN | DIASTOLIC BLOOD PRESSURE: 70 MMHG | BODY MASS INDEX: 21.51 KG/M2 | SYSTOLIC BLOOD PRESSURE: 102 MMHG

## 2024-09-10 DIAGNOSIS — N95.0 POSTMENOPAUSAL BLEEDING: ICD-10-CM

## 2024-09-10 DIAGNOSIS — N93.9 ABNORMAL UTERINE AND VAGINAL BLEEDING, UNSPECIFIED: ICD-10-CM

## 2024-09-10 DIAGNOSIS — Z11.3 ENCOUNTER FOR SCREENING FOR INFECTIONS WITH A PREDOMINANTLY SEXUAL MODE OF TRANSMISSION: ICD-10-CM

## 2024-09-10 PROCEDURE — 99459 PELVIC EXAMINATION: CPT

## 2024-09-10 PROCEDURE — 99203 OFFICE O/P NEW LOW 30 MIN: CPT

## 2024-09-10 NOTE — PHYSICAL EXAM
[Chaperone Present] : A chaperone was present in the examining room during all aspects of the physical examination [06312] : A chaperone was present during the pelvic exam. [Appropriately responsive] : appropriately responsive [Oriented x3] : oriented x3 [Labia Majora] : normal [Labia Minora] : normal [Atrophy] : atrophy [Scant] : There was scant vaginal bleeding [Normal] : normal [Normal Position] : in a normal position [Uterine Adnexae] : non-palpable [FreeTextEntry2] : VIRAL Coates [Tenderness] : nontender [FreeTextEntry5] : petichia on outer portion of anterior cervix

## 2024-09-10 NOTE — HISTORY OF PRESENT ILLNESS
[Patient reported mammogram was normal] : Patient reported mammogram was normal [Patient reported PAP Smear was normal] : Patient reported PAP Smear was normal [Patient reported colonoscopy was abnormal] : Patient reported colonoscopy was abnormal [postmenopausal] : postmenopausal [Y] : Patient is sexually active [Currently Active] : currently active [Men] : men [Mammogramdate] : 5/2023 [TextBox_19] : BIRADS II [PapSmeardate] : 3/2023 [TextBox_31] : NILM HPV negative [ColonoscopyDate] : 5/2023 [TextBox_43] : Polyps removed repeat in 3 years [LMPDate] : 3/2023 [facility] [FreeTextEntry1] : SVDx1 twins, IUI

## 2024-09-10 NOTE — REVIEW OF SYSTEMS
[Fatigue] : fatigue [Abn Vaginal bleeding] : abnormal vaginal bleeding [Anxiety] : anxiety [Sleep Disturbances] : sleep disturbances [Decreased Libido] : decreased libido [Negative] : Breast [FreeTextEntry8] : Nocturia

## 2024-09-11 LAB
ALBUMIN SERPL ELPH-MCNC: 4.6 G/DL
ALP BLD-CCNC: 60 U/L
ALT SERPL-CCNC: 10 U/L
ANION GAP SERPL CALC-SCNC: 13 MMOL/L
AST SERPL-CCNC: 26 U/L
BASOPHILS # BLD AUTO: 0.01 K/UL
BASOPHILS NFR BLD AUTO: 0.2 %
BILIRUB SERPL-MCNC: 0.4 MG/DL
BUN SERPL-MCNC: 18 MG/DL
CALCIUM SERPL-MCNC: 9.9 MG/DL
CHLORIDE SERPL-SCNC: 106 MMOL/L
CO2 SERPL-SCNC: 20 MMOL/L
CREAT SERPL-MCNC: 0.65 MG/DL
EGFR: 112 ML/MIN/1.73M2
EOSINOPHIL # BLD AUTO: 0.05 K/UL
EOSINOPHIL NFR BLD AUTO: 1 %
ESTIMATED AVERAGE GLUCOSE: 100 MG/DL
ESTRADIOL SERPL-MCNC: 27 PG/ML
FSH SERPL-MCNC: 93.5 IU/L
GLUCOSE SERPL-MCNC: 88 MG/DL
HBA1C MFR BLD HPLC: 5.1 %
HCT VFR BLD CALC: 39.8 %
HGB BLD-MCNC: 12.7 G/DL
HSV+VZV DNA SPEC QL NAA+PROBE: NOT DETECTED
IMM GRANULOCYTES NFR BLD AUTO: 0.4 %
LH SERPL-ACNC: 33.5 IU/L
LYMPHOCYTES # BLD AUTO: 1.41 K/UL
LYMPHOCYTES NFR BLD AUTO: 27.4 %
MAN DIFF?: NORMAL
MCHC RBC-ENTMCNC: 31.9 GM/DL
MCHC RBC-ENTMCNC: 32 PG
MCV RBC AUTO: 100.3 FL
MONOCYTES # BLD AUTO: 0.42 K/UL
MONOCYTES NFR BLD AUTO: 8.2 %
NEUTROPHILS # BLD AUTO: 3.23 K/UL
NEUTROPHILS NFR BLD AUTO: 62.8 %
PLATELET # BLD AUTO: 211 K/UL
POTASSIUM SERPL-SCNC: 4.4 MMOL/L
PROLACTIN SERPL-MCNC: 7.6 NG/ML
PROT SERPL-MCNC: 7.3 G/DL
RBC # BLD: 3.97 M/UL
RBC # FLD: 14 %
SODIUM SERPL-SCNC: 140 MMOL/L
SPECIMEN SOURCE: NORMAL
TSH SERPL-ACNC: 0.9 UIU/ML
WBC # FLD AUTO: 5.14 K/UL

## 2024-09-17 LAB
TESTOST FREE SERPL-MCNC: 0.1 PG/ML
TESTOST SERPL-MCNC: 6.7 NG/DL

## 2024-09-20 ENCOUNTER — APPOINTMENT (OUTPATIENT)
Dept: OBGYN | Facility: CLINIC | Age: 44
End: 2024-09-20
Payer: COMMERCIAL

## 2024-09-20 ENCOUNTER — ASOB RESULT (OUTPATIENT)
Age: 44
End: 2024-09-20

## 2024-09-20 PROCEDURE — 76830 TRANSVAGINAL US NON-OB: CPT

## 2024-09-24 NOTE — HISTORY OF PRESENT ILLNESS
[FreeTextEntry1] : This is a 44 year old female referred by Dr. Burch for evaluation for a painful breast lump. She first noticed it in November.  She states that this was the first time she experienced this type of pain. She states the lump and pain resolved after her period started. She states the pain started 1 month prior to her period and then when she pressed on the pain she felt a lumpiness. Denies any trauma, changes to herbs, diet or supplements.  She does SBE.  She has not noticed a change in her breast or a breast lump currently. She has not noticed a change in her nipple or nipple area. She has not noticed a change in the skin of the breast. She is not experiencing nipple discharge. She is not experiencing breast pain currently. She has not noticed a lump or lymph node under the armpit.   BREAST CANCER RISK FACTORS Menarche: 12 Date of LMP: 2022 Menopause: post Grav:  1    Para: 2 ( 8 year old twin boys) Age at first live birth: 34 Nursed: Yes for 8 months  Hysterectomy: N  Oophorectomy: N  OCP: Y currently for about 12 HRT: Y for 2 months in the past Divigel; IUI x3 cycles Last pap/pelvic exam: 5/2023 WNL Related family history: none Ashkenazi: N Mastery risk assessment: BRCAPRO 14.1% TCv7 11.9% TCv8 10.0% Kaley 9.9% BRCA testing: N  Bra size: 34B  Last mammogram: 5/25/2023        Location: [facility] Report reviewed.                          Images reviewed. Results: BIRADS 2 Extremely dense breasts. Stable examination with no evidence of malignancy.    Last ultrasound: 5/25/2023               Location: [facility] Report reviewed.                                 Images reviewed.  Results: BIRADS 2 stable examination with no evidence of malignancy.   Last MRI:                                             Location: Report reviewed.

## 2024-09-24 NOTE — ASSESSMENT
[FreeTextEntry1] : 42 yo female with right breast pain/lump  reviewed her risk status, she is not high risk We reviewed risk reduction strategies including maintaining a BMI <25, limiting red meat intake and alcoholic beverages to 3 per week and exercise (150 min/ week low intensity or 75 min/week high intensity). And maintaining a normal vitamin D level.  if negative plan mg/sono  5/2024 reviewed common etiologies of breast pain including caffeine, hormones and stress f/u with me after annual imaging She knows to call or return sooner should any concerns or questions arise.

## 2024-09-24 NOTE — PHYSICAL EXAM
[Normocephalic] : normocephalic [Atraumatic] : atraumatic [Supple] : supple [No Supraclavicular Adenopathy] : no supraclavicular adenopathy [Examined in the supine and seated position] : examined in the supine and seated position [Symmetrical] : symmetrical [No dominant masses] : no dominant masses left breast [No Nipple Retraction] : no left nipple retraction [No Nipple Discharge] : no left nipple discharge [No Axillary Lymphadenopathy] : no left axillary lymphadenopathy [No Edema] : no edema [No Rashes] : no rashes [No Ulceration] : no ulceration [de-identified] : in area of patient's concern mid-sternal line along rib there is no longer a nodularity corresponding to her concern, no redness, no skin changes

## 2024-09-24 NOTE — CONSULT LETTER
[Dear  ___] : Dear  [unfilled], [Courtesy Letter:] : I had the pleasure of seeing your patient, [unfilled], in my office today. [Please see my note below.] : Please see my note below. [Consult Closing:] : Thank you very much for allowing me to participate in the care of this patient.  If you have any questions, please do not hesitate to contact me. [Sincerely,] : Sincerely, [FreeTextEntry3] : Yamile Mckay MS DO\par  Breast Surgeon\par  [facility] \[place] [address]\par   [DrTrever  ___] : Dr. CAPPS

## 2024-10-01 ENCOUNTER — APPOINTMENT (OUTPATIENT)
Dept: BREAST CENTER | Facility: CLINIC | Age: 44
End: 2024-10-01

## 2024-10-22 ENCOUNTER — APPOINTMENT (OUTPATIENT)
Dept: OBGYN | Facility: CLINIC | Age: 44
End: 2024-10-22
Payer: COMMERCIAL

## 2024-10-22 VITALS
HEIGHT: 64 IN | SYSTOLIC BLOOD PRESSURE: 120 MMHG | WEIGHT: 126 LBS | DIASTOLIC BLOOD PRESSURE: 82 MMHG | BODY MASS INDEX: 21.51 KG/M2

## 2024-10-22 DIAGNOSIS — N95.0 POSTMENOPAUSAL BLEEDING: ICD-10-CM

## 2024-10-22 LAB
HCG UR QL: NEGATIVE
QUALITY CONTROL: YES

## 2024-10-22 PROCEDURE — 99214 OFFICE O/P EST MOD 30 MIN: CPT

## 2024-10-22 PROCEDURE — 81025 URINE PREGNANCY TEST: CPT

## 2024-10-22 PROCEDURE — G2211 COMPLEX E/M VISIT ADD ON: CPT | Mod: NC

## 2025-02-14 ENCOUNTER — APPOINTMENT (OUTPATIENT)
Dept: INTERNAL MEDICINE | Facility: CLINIC | Age: 45
End: 2025-02-14
Payer: COMMERCIAL

## 2025-02-14 VITALS
OXYGEN SATURATION: 98 % | BODY MASS INDEX: 22.2 KG/M2 | SYSTOLIC BLOOD PRESSURE: 120 MMHG | WEIGHT: 130 LBS | DIASTOLIC BLOOD PRESSURE: 90 MMHG | HEIGHT: 64 IN | HEART RATE: 60 BPM

## 2025-02-14 DIAGNOSIS — M79.89 OTHER SPECIFIED SOFT TISSUE DISORDERS: ICD-10-CM

## 2025-02-14 PROCEDURE — 99213 OFFICE O/P EST LOW 20 MIN: CPT

## 2025-03-01 ENCOUNTER — NON-APPOINTMENT (OUTPATIENT)
Age: 45
End: 2025-03-01

## 2025-03-04 ENCOUNTER — RESULT REVIEW (OUTPATIENT)
Age: 45
End: 2025-03-04

## 2025-03-21 ENCOUNTER — TRANSCRIPTION ENCOUNTER (OUTPATIENT)
Age: 45
End: 2025-03-21

## 2025-06-04 ENCOUNTER — APPOINTMENT (OUTPATIENT)
Dept: SURGERY | Facility: CLINIC | Age: 45
End: 2025-06-04
Payer: COMMERCIAL

## 2025-06-04 VITALS
OXYGEN SATURATION: 97 % | WEIGHT: 126.4 LBS | SYSTOLIC BLOOD PRESSURE: 125 MMHG | DIASTOLIC BLOOD PRESSURE: 72 MMHG | HEIGHT: 64 IN | TEMPERATURE: 98.6 F | BODY MASS INDEX: 21.58 KG/M2 | HEART RATE: 77 BPM

## 2025-06-04 PROCEDURE — 99213 OFFICE O/P EST LOW 20 MIN: CPT

## 2025-06-06 ENCOUNTER — TRANSCRIPTION ENCOUNTER (OUTPATIENT)
Age: 45
End: 2025-06-06

## 2025-06-09 ENCOUNTER — APPOINTMENT (OUTPATIENT)
Dept: SURGERY | Facility: HOSPITAL | Age: 45
End: 2025-06-09

## 2025-06-09 ENCOUNTER — RESULT REVIEW (OUTPATIENT)
Age: 45
End: 2025-06-09

## 2025-06-09 ENCOUNTER — TRANSCRIPTION ENCOUNTER (OUTPATIENT)
Age: 45
End: 2025-06-09

## 2025-06-16 ENCOUNTER — RESULT REVIEW (OUTPATIENT)
Age: 45
End: 2025-06-16

## 2025-06-25 ENCOUNTER — APPOINTMENT (OUTPATIENT)
Dept: SURGERY | Facility: CLINIC | Age: 45
End: 2025-06-25
Payer: COMMERCIAL

## 2025-06-25 VITALS
HEART RATE: 69 BPM | DIASTOLIC BLOOD PRESSURE: 70 MMHG | TEMPERATURE: 98.2 F | SYSTOLIC BLOOD PRESSURE: 110 MMHG | OXYGEN SATURATION: 98 %

## 2025-06-25 PROCEDURE — 99024 POSTOP FOLLOW-UP VISIT: CPT

## 2025-06-26 ENCOUNTER — APPOINTMENT (OUTPATIENT)
Dept: OBGYN | Facility: CLINIC | Age: 45
End: 2025-06-26
Payer: COMMERCIAL

## 2025-06-26 ENCOUNTER — ASOB RESULT (OUTPATIENT)
Age: 45
End: 2025-06-26

## 2025-06-26 VITALS
SYSTOLIC BLOOD PRESSURE: 110 MMHG | HEIGHT: 64 IN | BODY MASS INDEX: 21.51 KG/M2 | WEIGHT: 126 LBS | DIASTOLIC BLOOD PRESSURE: 70 MMHG

## 2025-06-26 PROCEDURE — 99213 OFFICE O/P EST LOW 20 MIN: CPT

## 2025-06-26 PROCEDURE — 76830 TRANSVAGINAL US NON-OB: CPT

## 2025-06-30 ENCOUNTER — TRANSCRIPTION ENCOUNTER (OUTPATIENT)
Age: 45
End: 2025-06-30

## 2025-07-02 ENCOUNTER — APPOINTMENT (OUTPATIENT)
Dept: OBGYN | Facility: CLINIC | Age: 45
End: 2025-07-02
Payer: COMMERCIAL

## 2025-07-02 PROCEDURE — 99213 OFFICE O/P EST LOW 20 MIN: CPT | Mod: 95

## 2025-07-02 RX ORDER — PROGESTERONE 100 MG/1
100 CAPSULE ORAL
Qty: 90 | Refills: 3 | Status: ACTIVE | COMMUNITY
Start: 2025-07-02 | End: 1900-01-01

## 2025-07-02 RX ORDER — ESTRADIOL 0.1 MG/D
0.1 PATCH, EXTENDED RELEASE TRANSDERMAL
Qty: 24 | Refills: 1 | Status: ACTIVE | COMMUNITY
Start: 2025-07-02 | End: 1900-01-01